# Patient Record
Sex: MALE | Race: WHITE | NOT HISPANIC OR LATINO | Employment: STUDENT | URBAN - METROPOLITAN AREA
[De-identification: names, ages, dates, MRNs, and addresses within clinical notes are randomized per-mention and may not be internally consistent; named-entity substitution may affect disease eponyms.]

---
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
## 2020-02-18 ENCOUNTER — OFFICE VISIT (OUTPATIENT)
Dept: INTERNAL MEDICINE CLINIC | Facility: OTHER | Age: 20
End: 2020-02-18

## 2020-02-18 ENCOUNTER — DOCUMENTATION (OUTPATIENT)
Dept: INTERNAL MEDICINE CLINIC | Facility: OTHER | Age: 20
End: 2020-02-18

## 2020-02-18 VITALS
BODY MASS INDEX: 22.86 KG/M2 | HEART RATE: 72 BPM | WEIGHT: 129 LBS | SYSTOLIC BLOOD PRESSURE: 112 MMHG | DIASTOLIC BLOOD PRESSURE: 62 MMHG | HEIGHT: 63 IN

## 2020-02-18 DIAGNOSIS — Z59.9 INADEQUATE COMMUNITY RESOURCES: Primary | ICD-10-CM

## 2020-02-18 DIAGNOSIS — S05.92XA EYE INJURY, NON-PENETRATING, LEFT, INITIAL ENCOUNTER: ICD-10-CM

## 2020-02-18 SDOH — ECONOMIC STABILITY - INCOME SECURITY: PROBLEM RELATED TO HOUSING AND ECONOMIC CIRCUMSTANCES, UNSPECIFIED: Z59.9

## 2020-02-18 NOTE — PROGRESS NOTES
Assessment/Plan:  Marvin 23year old here for initial visit to [address] with the nurse coordinator  Has insurance and PCP but overdue for annual visit and needs transition to adult provider  Asked to see Flo Light due to eye injury he received while playing football on Saturday  He did not seek treatment of injury when it happened  Left upper eyelid with half inch laceration - dried blood visible  Due to visual disturbances - recommending ER visit  Community care coordinator helped him to get appointment at [facility] practice for tomorrow to hopefully get referral to ophthalmology if not seen in the ER, and to get annual PE  Explained that visual disturbance is concerning and needs prompt attention  Flobrad Light verbalizing understanding and stating he will go to the ER if it gets worse  Recommended ER visit today  Follow-up in 1 week for full provider visit and check connections/eye  Diagnoses and all orders for this visit:    Inadequate community resources    Eye injury, non-penetrating, left, initial encounter          Subjective:   No complaints today  Patient ID: Marlon Kidd  is a 23 y o  male  HPI   Here for initial visit to the medical David Henao today  During intake with nurse coordinator, he revealed eye injury from playing football with his friends on Saturday  He was struck in the eye with the football  He did not seek any medical attention since the injury  He denies any headaches, but admits to pain with extra-occular movement  He admits to some blurred vision  No spots, halos or floaters with vision  Has not taken any medication for pain or discomfort  Has insurance but overdue for PCP visit and will need transition to adult provider since he is 23  Mikayla Obdulio community care coordinator will work on this with him       The following portions of the patient's history were reviewed and updated as appropriate: allergies, current medications, past family history, past medical history, past social history, past surgical history and problem list     Review of Systems   Constitutional: Negative  HENT:        See HPI   Respiratory: Negative  Neurological: Negative  Objective:      /62 (BP Location: Left arm, Patient Position: Sitting, Cuff Size: Standard)   Pulse 72   Ht 5' 2 5" (1 588 m)   Wt 58 5 kg (129 lb)   BMI 23 22 kg/m²          Physical Exam   Constitutional: He is oriented to person, place, and time  He appears well-developed and well-nourished  HENT:   Head: Normocephalic  Eyes: Pupils are equal, round, and reactive to light  EOM are normal    Pain with EOM but movements intact; abrasion on medial aspect of left inner eye/nose; half-inch laceration on left upper eyelid - edges not approximated, but no active bleeding  Sclera with some redness   Pulmonary/Chest: Effort normal    Neurological: He is alert and oriented to person, place, and time  No cranial nerve deficit  Skin: Skin is warm and dry  Psychiatric: He has a normal mood and affect   His behavior is normal  Judgment and thought content normal

## 2020-02-18 NOTE — PROGRESS NOTES
Ami Kade  is here for his initial visit to [facility]  this school year  Consent verified  He is currently in 10th grade at [address]: [address]  Left eye injury playing football on 2/15/20 - blurry vision, pain in left eye, pain with blinking, denies headaches  Student in to meet with Provider due to eye injury  Connections  Insurance: [facility]  PCP: Referred to PCP - Edmundo Gregorio - 10 minute walk from house - informed about Lyft if transportation barrier becomes an issue  Dental: Referred - resources given  Vision: Glasses - Referred - failed vision exam   Mental Health: PHQ-9=9; no self harm risk; scheduled to meet with counselor in school    Student will follow up in 2 weeks to meet with Provider for [facility]

## 2020-02-18 NOTE — PROGRESS NOTES
Student was seen on medical Pamella Srinivasan today and requested assistance in calling PCP - Student goes to [facility] states since student is 23, he needs to be seen by adult clinic  [id number] [facility] to schedule PE and visit for laceration/ blurred vision on left eye  Student was scheduled for tomorrow, 2/19/20 at 3:00PM   [facility] advised must call [facility] before appt to switch assigned PCP  Called [facility] with patient and was able to switch PCP to [address]  [facility]  Student was also given dental Pamella Srinivasan consent for him to sign to be seen on DV

## 2020-02-19 ENCOUNTER — OFFICE VISIT (OUTPATIENT)
Dept: INTERNAL MEDICINE CLINIC | Facility: CLINIC | Age: 20
End: 2020-02-19

## 2020-02-19 ENCOUNTER — HOSPITAL ENCOUNTER (EMERGENCY)
Facility: HOSPITAL | Age: 20
Discharge: HOME | End: 2020-02-19
Attending: EMERGENCY MEDICINE | Admitting: EMERGENCY MEDICINE
Payer: COMMERCIAL

## 2020-02-19 VITALS
DIASTOLIC BLOOD PRESSURE: 80 MMHG | OXYGEN SATURATION: 97 % | SYSTOLIC BLOOD PRESSURE: 118 MMHG | HEIGHT: 62 IN | BODY MASS INDEX: 23.77 KG/M2 | TEMPERATURE: 99.9 F | WEIGHT: 129.19 LBS | HEART RATE: 65 BPM

## 2020-02-19 VITALS
RESPIRATION RATE: 18 BRPM | SYSTOLIC BLOOD PRESSURE: 156 MMHG | DIASTOLIC BLOOD PRESSURE: 68 MMHG | HEART RATE: 61 BPM | TEMPERATURE: 98.7 F | OXYGEN SATURATION: 97 %

## 2020-02-19 DIAGNOSIS — R06.02 SOB (SHORTNESS OF BREATH) ON EXERTION: ICD-10-CM

## 2020-02-19 DIAGNOSIS — Z23 NEED FOR INFLUENZA VACCINATION: ICD-10-CM

## 2020-02-19 DIAGNOSIS — H53.8 BLURRED VISION, LEFT EYE: Primary | ICD-10-CM

## 2020-02-19 DIAGNOSIS — H57.12 LEFT EYE PAIN: Primary | ICD-10-CM

## 2020-02-19 PROBLEM — R06.00 DYSPNEA ON EXERTION: Status: ACTIVE | Noted: 2020-02-19

## 2020-02-19 PROBLEM — R06.09 DYSPNEA ON EXERTION: Status: ACTIVE | Noted: 2020-02-19

## 2020-02-19 PROBLEM — J38.3 VOCAL CORD STRAIN: Status: RESOLVED | Noted: 2019-04-11 | Resolved: 2020-02-19

## 2020-02-19 PROCEDURE — 90686 IIV4 VACC NO PRSV 0.5 ML IM: CPT | Performed by: INTERNAL MEDICINE

## 2020-02-19 PROCEDURE — 99283 EMERGENCY DEPT VISIT LOW MDM: CPT

## 2020-02-19 PROCEDURE — 90471 IMMUNIZATION ADMIN: CPT | Performed by: INTERNAL MEDICINE

## 2020-02-19 PROCEDURE — 3008F BODY MASS INDEX DOCD: CPT | Performed by: INTERNAL MEDICINE

## 2020-02-19 PROCEDURE — 99282 EMERGENCY DEPT VISIT SF MDM: CPT | Performed by: EMERGENCY MEDICINE

## 2020-02-19 PROCEDURE — 99203 OFFICE O/P NEW LOW 30 MIN: CPT | Performed by: INTERNAL MEDICINE

## 2020-02-19 PROCEDURE — 1036F TOBACCO NON-USER: CPT | Performed by: INTERNAL MEDICINE

## 2020-02-19 RX ORDER — ALBUTEROL SULFATE 90 UG/1
2 AEROSOL, METERED RESPIRATORY (INHALATION) EVERY 6 HOURS PRN
Qty: 1 INHALER | Refills: 5 | Status: SHIPPED | OUTPATIENT
Start: 2020-02-19 | End: 2021-03-23 | Stop reason: SDUPTHER

## 2020-02-19 RX ORDER — TETRACAINE HYDROCHLORIDE 5 MG/ML
1 SOLUTION OPHTHALMIC ONCE
Status: COMPLETED | OUTPATIENT
Start: 2020-02-19 | End: 2020-02-19

## 2020-02-19 RX ADMIN — FLUORESCEIN SODIUM 1 STRIP: 1 STRIP OPHTHALMIC at 20:37

## 2020-02-19 RX ADMIN — TETRACAINE HYDROCHLORIDE 1 DROP: 5 SOLUTION OPHTHALMIC at 20:38

## 2020-02-19 NOTE — Clinical Note
Please call patient to let him know that we'd like to obtain baseline labs for him at his earliest convenience  Orders have been placed
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
## 2020-02-19 NOTE — PATIENT INSTRUCTIONS
Blurred Vision   WHAT YOU NEED TO KNOW:   Blurred vision is when you cannot see fine details  You may have blurred vision if you are nearsighted or farsighted and you need glasses  Blurred vision may be caused by a corneal abrasion (scratch on the cornea) or a corneal ulcer (open sore)  You may have blurred vision if your eye came into contact with a chemical  A foreign body or infection may also cause blurred vision  Medical conditions, such as cataracts, glaucoma, detached retina, and nerve disorders can also cause blurred vision  Blurred vision may also be caused by a concussion or a tumor  If you have diabetes, you may develop diabetic retinopathy  Diabetic retinopathy damages the blood vessels of your retina  DISCHARGE INSTRUCTIONS:   Return to the emergency department if:   · You have weakness in an arm or leg, difficulty speaking or seeing, and a severe headache  · You have a fever, eye pain, or discharge  · You have a sudden loss of vision  Contact your healthcare provider if:   · Your blurred vision gets worse  · Your blurred vision is worse in the morning  · You have a sudden headache or eye pain  · Your eye has swelling, redness, or discharge  · You see floaters, flashes of light, fine dots, or cobweb shapes  · You have questions or concerns about your condition or care  Medicines: You may  need any of the following:  · Prescription pain medicine  may be given  Ask how to take this medicine safely  · Antibiotics  help prevent or treat an eye infection caused by bacteria  It may be given as eyedrops or an ointment  · Take your medicine as directed  Contact your healthcare provider if you think your medicine is not helping or if you have side effects  Tell him of her if you are allergic to any medicine  Keep a list of the medicines, vitamins, and herbs you take  Include the amounts, and when and why you take them  Bring the list or the pill bottles to follow-up visits  Carry your medicine list with you in case of an emergency  Manage your blurred vision:  Your healthcare provider may ask you to do any of the following:  · Use artificial tears  to keep your eye moist or to soothe your irritated eye  · Apply a cool compress  to decrease any swelling or pain  Wet a clean washcloth with cool water and place it on your eye  Use the cool compress as often as directed  · Wear an eye patch as directed  to protect your eye  Follow up with your healthcare provider as directed: You may need other eye exams and medicines  Write down your questions so you remember to ask them during your visits  © 2017 [address] Information is for End User's use only and may not be sold, redistributed or otherwise used for commercial purposes  All illustrations and images included in CareNotes® are the copyrighted property of A [employer] , [employer]  or [employer]  The above information is an  only  It is not intended as medical advice for individual conditions or treatments  Talk to your doctor, nurse or pharmacist before following any medical regimen to see if it is safe and effective for you  How to Use a Metered-Dose Inhaler   WHAT YOU NEED TO KNOW:   A metered-dose inhaler is a handheld device that gives you a dose of medicine as a mist  You breathe the medicine deep into your lungs to open your airways  The medicine either gives quick relief or long term control of symptoms  Bronchodilators open your airways  Mucolytics thin secretions and make them easier to cough up  Steroids decrease inflammation in your airways  DISCHARGE INSTRUCTIONS:   Return to the emergency department if:   · Your lips or nails turn blue or gray  · The skin between your ribs or around your neck pulls in with every breath  · You feel short of breath, even after you use your inhaler    Contact your healthcare provider if:   · You feel the medicine spray on your tongue or throat, rather than going into your lungs  · You have to take more puffs from the inhaler than directed, in order to get relief  · You run out of medicine before your next refill is due  · You feel like your medicine is not making your symptoms better  · You have questions or concerns about your condition or care  How to use a metered-dose inhaler:  Practice using your inhaler  Your medicine will work best if you use them correctly  The following steps will help you use your inhaler correctly:  · Prepare your inhaler:     ¨ Remove the cap  Check to make sure there is nothing in the mouthpiece that could block the medicine from coming out  ¨ Shake the inhaler to mix the medicine  ¨ Hold the inhaler upright, with the mouthpiece pointing towards your mouth  · Get ready to breathe in the medicine:      ¨ Keep your mouth away from the inhaler mouthpiece, and breathe out fully to clear your lungs  ¨ Place the mouthpiece between your lips  Close your lips around the mouthpiece to form a seal and prevent a medicine leak  · Start to breathe in slowly through your mouth  as  you press down the canister  This helps the medicine get into your lungs  · Hold your breath for at least 5 seconds  This will help the medicine reach all parts of your lungs, including the smaller parts called the alveoli  · Breathe out slowly through pursed lips  This helps keep your airway open and allows the medicine to be absorbed into more areas  · Repeat puffs of medicine as directed by your healthcare provider  Wait about 2 minutes between puffs  If you need to use a bronchodilator and a steroid inhaler, use the bronchodilator first  Wait 5 minutes then use the steroid inhaler  · Gargle with warm water to remove any leftover medicine from your mouth and throat  Care for your inhaler properly:  Put the cap back on the inhaler after each use to keep the mouthpiece clean   Clean the inhaler at least once a week  Remove the canister and wash the lopez with warm soapy water  Rinse and allow to air dry  Make sure the lopez is completely dry before putting the canister back in  Follow up with your healthcare provider or specialist as directed:  Bring your inhaler to all of your visits  You may be asked to use your inhaler at these visits so your healthcare provider can make sure you are using it correctly  Write down your questions, so you remember to ask them during your visits  © 2017 [address] Information is for End User's use only and may not be sold, redistributed or otherwise used for commercial purposes  All illustrations and images included in CareNotes® are the copyrighted property of A [employer] M [employer]  or [employer]  The above information is an  only  It is not intended as medical advice for individual conditions or treatments  Talk to your doctor, nurse or pharmacist before following any medical regimen to see if it is safe and effective for you
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
## 2020-03-25 ENCOUNTER — TELEPHONE (OUTPATIENT)
Dept: INTERNAL MEDICINE CLINIC | Facility: OTHER | Age: 20
End: 2020-03-25

## 2020-03-25 NOTE — TELEPHONE ENCOUNTER
Spoke with student to check connections and share resources regarding COVID  Student no showed his PE yesterday, but will be calling to reschedule  Student has not been on DV yet  Student is aware if he or any family members experience COVID symptoms, they should contact their PCP to be screened

## 2020-06-19 ENCOUNTER — TELEPHONE (OUTPATIENT)
Dept: INTERNAL MEDICINE CLINIC | Facility: OTHER | Age: 20
End: 2020-06-19

## 2020-10-01 ENCOUNTER — OFFICE VISIT (OUTPATIENT)
Dept: INTERNAL MEDICINE CLINIC | Facility: CLINIC | Age: 20
End: 2020-10-01

## 2020-10-01 VITALS
DIASTOLIC BLOOD PRESSURE: 72 MMHG | WEIGHT: 129.19 LBS | SYSTOLIC BLOOD PRESSURE: 120 MMHG | HEART RATE: 91 BPM | TEMPERATURE: 98.6 F | BODY MASS INDEX: 23.63 KG/M2 | OXYGEN SATURATION: 96 %

## 2020-10-01 DIAGNOSIS — Z23 NEED FOR INFLUENZA VACCINATION: ICD-10-CM

## 2020-10-01 DIAGNOSIS — H57.12 PAIN OF LEFT EYE: Primary | ICD-10-CM

## 2020-10-01 PROCEDURE — 1036F TOBACCO NON-USER: CPT | Performed by: INTERNAL MEDICINE

## 2020-10-01 PROCEDURE — 90471 IMMUNIZATION ADMIN: CPT | Performed by: INTERNAL MEDICINE

## 2020-10-01 PROCEDURE — 90686 IIV4 VACC NO PRSV 0.5 ML IM: CPT | Performed by: INTERNAL MEDICINE

## 2020-10-01 PROCEDURE — 99213 OFFICE O/P EST LOW 20 MIN: CPT | Performed by: INTERNAL MEDICINE

## 2020-11-16 ENCOUNTER — TELEPHONE (OUTPATIENT)
Dept: INTERNAL MEDICINE CLINIC | Facility: CLINIC | Age: 20
End: 2020-11-16

## 2020-11-20 DIAGNOSIS — Z01.01 FAILED VISION SCREEN: Primary | ICD-10-CM

## 2021-03-16 ENCOUNTER — OFFICE VISIT (OUTPATIENT)
Dept: INTERNAL MEDICINE CLINIC | Facility: CLINIC | Age: 21
End: 2021-03-16

## 2021-03-16 VITALS
HEART RATE: 103 BPM | TEMPERATURE: 98.6 F | SYSTOLIC BLOOD PRESSURE: 132 MMHG | WEIGHT: 130 LBS | DIASTOLIC BLOOD PRESSURE: 78 MMHG | BODY MASS INDEX: 23.78 KG/M2

## 2021-03-16 DIAGNOSIS — R07.9 CHEST PAIN, UNSPECIFIED TYPE: Primary | ICD-10-CM

## 2021-03-16 PROCEDURE — 1036F TOBACCO NON-USER: CPT | Performed by: INTERNAL MEDICINE

## 2021-03-16 PROCEDURE — 99213 OFFICE O/P EST LOW 20 MIN: CPT | Performed by: INTERNAL MEDICINE

## 2021-03-16 PROCEDURE — 93000 ELECTROCARDIOGRAM COMPLETE: CPT | Performed by: INTERNAL MEDICINE
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
## 2021-03-22 ENCOUNTER — TELEPHONE (OUTPATIENT)
Dept: INTERNAL MEDICINE CLINIC | Facility: CLINIC | Age: 21
End: 2021-03-22

## 2021-03-22 ENCOUNTER — TELEMEDICINE (OUTPATIENT)
Dept: INTERNAL MEDICINE CLINIC | Facility: CLINIC | Age: 21
End: 2021-03-22

## 2021-03-22 DIAGNOSIS — J01.00 ACUTE MAXILLARY SINUSITIS, RECURRENCE NOT SPECIFIED: Primary | ICD-10-CM

## 2021-03-22 PROCEDURE — 99213 OFFICE O/P EST LOW 20 MIN: CPT | Performed by: HOSPITALIST

## 2021-03-22 RX ORDER — GUAIFENESIN 600 MG
600 TABLET, EXTENDED RELEASE 12 HR ORAL EVERY 12 HOURS SCHEDULED
Qty: 30 TABLET | Refills: 0 | Status: SHIPPED | OUTPATIENT
Start: 2021-03-22

## 2021-03-22 RX ORDER — FLUTICASONE PROPIONATE 50 MCG
1 SPRAY, SUSPENSION (ML) NASAL DAILY
Qty: 1 BOTTLE | Refills: 0 | Status: SHIPPED | OUTPATIENT
Start: 2021-03-22

## 2021-03-22 RX ORDER — ACETAMINOPHEN 500 MG
500 TABLET ORAL EVERY 6 HOURS PRN
COMMUNITY

## 2021-03-22 NOTE — TELEPHONE ENCOUNTER
Patient called in concerned he has been experiencing body aches shortness of breath pressure in his head to the point he cant barely see scheduled appointment for patient and transferred him to speak to Master Martinez

## 2021-03-22 NOTE — TELEPHONE ENCOUNTER
Patient start with symptoms, Headaches ,irritated eyes, stuffy nose , mid back pain  Hard to breath because of the stuffy  Nose and he need to breath thru his mouth  Try tylenol with out a relief  Patient is schedule for today @ 4:30  Advise patient is the symptoms get worsens before 4:30 to give us a call back

## 2021-03-23 DIAGNOSIS — R06.02 SOB (SHORTNESS OF BREATH) ON EXERTION: ICD-10-CM

## 2021-03-23 RX ORDER — ALBUTEROL SULFATE 90 UG/1
2 AEROSOL, METERED RESPIRATORY (INHALATION) EVERY 6 HOURS PRN
Qty: 3 INHALER | Refills: 3 | Status: SHIPPED | OUTPATIENT
Start: 2021-03-23

## 2021-03-23 NOTE — TELEPHONE ENCOUNTER
Name of medication, dose, quantity and frequency    Requested Prescriptions     Pending Prescriptions Disp Refills    albuterol (PROVENTIL HFA,VENTOLIN HFA) 90 mcg/act inhaler 1 Inhaler 5     Sig: Inhale 2 puffs every 6 (six) hours as needed for wheezing or shortness of breath       Number of refills left: NONE    Amount of medication left: NONE    Pharmacy verified and updated- YES, Dany    Additional information:    Patient is completely out   Please send ASAP
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
## 2021-05-14 DIAGNOSIS — J01.00 ACUTE MAXILLARY SINUSITIS, RECURRENCE NOT SPECIFIED: ICD-10-CM

## 2021-05-17 RX ORDER — FLUTICASONE PROPIONATE 50 MCG
SPRAY, SUSPENSION (ML) NASAL
Qty: 16 ML | OUTPATIENT
Start: 2021-05-17

## 2021-06-29 ENCOUNTER — TELEPHONE (OUTPATIENT)
Dept: INTERNAL MEDICINE CLINIC | Facility: CLINIC | Age: 21
End: 2021-06-29

## 2021-06-29 ENCOUNTER — TELEMEDICINE (OUTPATIENT)
Dept: INTERNAL MEDICINE CLINIC | Facility: CLINIC | Age: 21
End: 2021-06-29

## 2021-06-29 DIAGNOSIS — R17 ELEVATED BILIRUBIN: ICD-10-CM

## 2021-06-29 DIAGNOSIS — J45.909 ASTHMA: Primary | ICD-10-CM

## 2021-06-29 PROCEDURE — 99213 OFFICE O/P EST LOW 20 MIN: CPT | Performed by: INTERNAL MEDICINE

## 2021-06-29 NOTE — TELEPHONE ENCOUNTER
Patient had a virtual today 7 is due Return in about 6 months (around 12/29/2021) for Next scheduled follow up   I called patient to schedule an appt & the ph# stated it's not in service

## 2021-06-29 NOTE — PATIENT INSTRUCTIONS
Please use fluticasone-salmeterol inhaler 2 times daily  Please obtain laboratory work (CMP and CBC) at a [facility] when able  Please obtain pulmonary function tests when able
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
## 2021-07-08 ENCOUNTER — APPOINTMENT (OUTPATIENT)
Dept: LAB | Facility: CLINIC | Age: 21
End: 2021-07-08
Payer: COMMERCIAL

## 2021-07-08 DIAGNOSIS — R17 ELEVATED BILIRUBIN: ICD-10-CM

## 2021-07-08 LAB
ALBUMIN SERPL BCP-MCNC: 3.9 G/DL (ref 3.5–5)
ALP SERPL-CCNC: 64 U/L (ref 46–116)
ALT SERPL W P-5'-P-CCNC: 21 U/L (ref 12–78)
ANION GAP SERPL CALCULATED.3IONS-SCNC: 4 MMOL/L (ref 4–13)
AST SERPL W P-5'-P-CCNC: 7 U/L (ref 5–45)
BILIRUB SERPL-MCNC: 0.47 MG/DL (ref 0.2–1)
BUN SERPL-MCNC: 10 MG/DL (ref 5–25)
CALCIUM SERPL-MCNC: 9.1 MG/DL (ref 8.3–10.1)
CHLORIDE SERPL-SCNC: 107 MMOL/L (ref 100–108)
CO2 SERPL-SCNC: 28 MMOL/L (ref 21–32)
CREAT SERPL-MCNC: 1 MG/DL (ref 0.6–1.3)
ERYTHROCYTE [DISTWIDTH] IN BLOOD BY AUTOMATED COUNT: 12.6 % (ref 11.6–15.1)
GFR SERPL CREATININE-BSD FRML MDRD: 108 ML/MIN/1.73SQ M
GLUCOSE P FAST SERPL-MCNC: 102 MG/DL (ref 65–99)
HCT VFR BLD AUTO: 46 % (ref 36.5–49.3)
HGB BLD-MCNC: 15.2 G/DL (ref 12–17)
MCH RBC QN AUTO: 27.7 PG (ref 26.8–34.3)
MCHC RBC AUTO-ENTMCNC: 33 G/DL (ref 31.4–37.4)
MCV RBC AUTO: 84 FL (ref 82–98)
PLATELET # BLD AUTO: 279 THOUSANDS/UL (ref 149–390)
PMV BLD AUTO: 10.1 FL (ref 8.9–12.7)
POTASSIUM SERPL-SCNC: 4 MMOL/L (ref 3.5–5.3)
PROT SERPL-MCNC: 7.5 G/DL (ref 6.4–8.2)
RBC # BLD AUTO: 5.48 MILLION/UL (ref 3.88–5.62)
SODIUM SERPL-SCNC: 139 MMOL/L (ref 136–145)
WBC # BLD AUTO: 6.08 THOUSAND/UL (ref 4.31–10.16)

## 2021-07-08 PROCEDURE — 36415 COLL VENOUS BLD VENIPUNCTURE: CPT

## 2021-07-08 PROCEDURE — 80053 COMPREHEN METABOLIC PANEL: CPT

## 2021-07-08 PROCEDURE — 85027 COMPLETE CBC AUTOMATED: CPT

## 2021-07-13 ENCOUNTER — TELEPHONE (OUTPATIENT)
Dept: INTERNAL MEDICINE CLINIC | Facility: CLINIC | Age: 21
End: 2021-07-13

## 2021-07-14 NOTE — TELEPHONE ENCOUNTER
Please let the patient know that all of his laboratory results, including his total bilirubin, was all within normal limits  Thank you

## 2021-07-15 NOTE — TELEPHONE ENCOUNTER
Called and left detailed voicemail informing patient of labs within normal limits  Informed patient if any further questions to call office

## 2021-07-21 NOTE — TELEPHONE ENCOUNTER
Patient called in asking about the lab results for his bilirubin  He said he wants to know if there is any explanation about why his bilirubin was high at his ER visit and why it is normal now  He said he was in the ER for shortness of breath   Can you review

## 2021-07-22 NOTE — TELEPHONE ENCOUNTER
Please let the patient know that it is unclear what exactly caused his bilirubin to be elevated, especially since he was seen at an ER in [place]  However the most likely explanation was that it was caused by a benign abnormality such as gilbert syndrome which causes an increase in bilirubin secondary to stress on the body  Thank you

## 2021-08-12 ENCOUNTER — PATIENT OUTREACH (OUTPATIENT)
Dept: INTERNAL MEDICINE CLINIC | Facility: OTHER | Age: 21
End: 2021-08-12

## 2022-02-28 NOTE — PROGRESS NOTES
Good morning Dr Walters       Can you please review this note and signed  Is been in my open encounters since 03/21/2021 and I can't close it

## 2022-03-07 ENCOUNTER — TELEPHONE (OUTPATIENT)
Dept: PSYCHIATRY | Facility: CLINIC | Age: 22
End: 2022-03-07

## 2022-03-07 NOTE — TELEPHONE ENCOUNTER
PT mom lvm, reached back out to pt mom  PT is over 18 so will need to give verbal consent  Pt has been added to wait list and suggeted call insurance company

## 2022-08-22 ENCOUNTER — TELEPHONE (OUTPATIENT)
Dept: INTERNAL MEDICINE CLINIC | Facility: CLINIC | Age: 22
End: 2022-08-22

## 2022-08-22 ENCOUNTER — HOSPITAL ENCOUNTER (EMERGENCY)
Facility: HOSPITAL | Age: 22
Discharge: HOME | End: 2022-08-22
Attending: EMERGENCY MEDICINE
Payer: MEDICARE

## 2022-08-22 VITALS
RESPIRATION RATE: 16 BRPM | TEMPERATURE: 98.3 F | SYSTOLIC BLOOD PRESSURE: 113 MMHG | OXYGEN SATURATION: 97 % | HEART RATE: 60 BPM | DIASTOLIC BLOOD PRESSURE: 57 MMHG

## 2022-08-22 DIAGNOSIS — R07.9 CHEST PAIN: Primary | ICD-10-CM

## 2022-08-22 DIAGNOSIS — R06.02 SOB (SHORTNESS OF BREATH) ON EXERTION: ICD-10-CM

## 2022-08-22 DIAGNOSIS — J45.909 ASTHMA: ICD-10-CM

## 2022-08-22 LAB
ATRIAL RATE: 64 BPM
P AXIS: 48 DEGREES
PR INTERVAL: 134 MS
QRS AXIS: 92 DEGREES
QRSD INTERVAL: 88 MS
QT INTERVAL: 412 MS
QTC INTERVAL: 425 MS
T WAVE AXIS: 64 DEGREES
VENTRICULAR RATE: 64 BPM

## 2022-08-22 PROCEDURE — 93005 ELECTROCARDIOGRAM TRACING: CPT

## 2022-08-22 PROCEDURE — 93308 TTE F-UP OR LMTD: CPT | Performed by: EMERGENCY MEDICINE

## 2022-08-22 PROCEDURE — 93010 ELECTROCARDIOGRAM REPORT: CPT | Performed by: INTERNAL MEDICINE

## 2022-08-22 PROCEDURE — 99284 EMERGENCY DEPT VISIT MOD MDM: CPT

## 2022-08-22 PROCEDURE — 99284 EMERGENCY DEPT VISIT MOD MDM: CPT | Performed by: EMERGENCY MEDICINE

## 2022-08-22 RX ORDER — ALBUTEROL SULFATE 90 UG/1
2 AEROSOL, METERED RESPIRATORY (INHALATION) EVERY 4 HOURS PRN
Qty: 8 G | Refills: 0 | Status: SHIPPED | OUTPATIENT
Start: 2022-08-22 | End: 2022-08-22

## 2022-08-22 RX ORDER — ALBUTEROL SULFATE 90 UG/1
2 AEROSOL, METERED RESPIRATORY (INHALATION) EVERY 6 HOURS PRN
Qty: 8 G | Refills: 0 | Status: SHIPPED | OUTPATIENT
Start: 2022-08-22

## 2022-08-22 NOTE — DISCHARGE INSTRUCTIONS
Thank you for coming to the ED for your care  Your EKG and imaging are reassuring    Please monitor your symptoms and if they get worse, we recommend returning for further evaluation

## 2022-08-22 NOTE — Clinical Note
Adarsh Paz was seen and treated in our emergency department on 8/22/2022  Diagnosis:     [address]    He may return on this date: 08/23/2022         If you have any questions or concerns, please don't hesitate to call        jV Triana MD    ______________________________           _______________          _______________  Hospital Representative                              Date                                Time

## 2022-08-22 NOTE — TELEPHONE ENCOUNTER
Spoke to patient, per patient he has had chest pain for 2 weeks now  Chest pain the last 2 days have got worse  Patient is complaining of shortness of breath, slurring words, left arm pain  Patient States he does work out and has been unable to work out due to the chest pain and shortness of breath  I did advise patient that his symptoms are concerning and he should report to ED for evaluation  Patient stated his mom is home and is able to take him to ED  Patient agreeable to going to ED to be evaluated  Patient will call office with any additional questions or concerns
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
## 2022-08-22 NOTE — ED ATTENDING ATTESTATION
8/22/2022  ITerrence MD, saw and evaluated the patient  I have discussed the patient with the resident/non-physician practitioner and agree with the resident's/non-physician practitioner's findings, Plan of Care, and MDM as documented in the resident's/non-physician practitioner's note, except where noted  All available labs and Radiology studies were reviewed  I was present for key portions of any procedure(s) performed by the resident/non-physician practitioner and I was immediately available to provide assistance  At this point I agree with the current assessment done in the Emergency Department  I have conducted an independent evaluation of this patient a history and physical is as follows:    ED Course     80-year-old male presenting to the emergency department for evaluation of left-sided chest pain, left armpit pain, and left hand pain  Patient reports that for the past 2 weeks he has been having intermittent episodes of left-sided chest pain, which now over the past 2 days has been radiating into his left armpit and associated with pain in the palm of the hand  Patient reports shortness of breath with ambulation  Patient reports palpitations occurring when when he has chest pain  Ten systems reviewed and negative except as noted in the history of present illness  The patient is resting comfortably on a stretcher in no acute respiratory distress  The patient appears nontoxic  HEENT reveals moist mucous membranes  Head is normocephalic and atraumatic  Conjunctiva and sclera are normal  Neck is nontender and supple with full range of motion to flexion, extension, lateral rotation  No meningismus appreciated  No masses are appreciated  Lungs are clear to auscultation bilaterally without any wheezes, rales or rhonchi  Heart is regular rate and rhythm without any murmurs, rubs or gallops  Abdomen is soft and nontender without any rebound or guarding   Extremities appear grossly normal without any significant arthropathy  Patient is awake, alert, and oriented x3  The patient has normal interaction  Motor is 5 out of 5           Labs Reviewed - No data to display        Critical Care Time  Procedures

## 2022-08-22 NOTE — TELEPHONE ENCOUNTER
Patient called asking for an appointment  He has been having progressively worse chest pain-lower left hand side, pressure, sharp pain for about 1-2 weeks  Left hand looses strength when he has the pain  But he's now having shortness of breath, and the last couple days he has been slurring his words  It might be from exhaustion he said  Call transferred to [facility] for further assistance

## 2023-05-10 ENCOUNTER — OFFICE VISIT (OUTPATIENT)
Dept: INTERNAL MEDICINE CLINIC | Facility: CLINIC | Age: 23
End: 2023-05-10

## 2023-05-10 VITALS
TEMPERATURE: 98.1 F | BODY MASS INDEX: 23.15 KG/M2 | HEART RATE: 82 BPM | OXYGEN SATURATION: 97 % | HEIGHT: 62 IN | SYSTOLIC BLOOD PRESSURE: 117 MMHG | WEIGHT: 125.8 LBS | DIASTOLIC BLOOD PRESSURE: 70 MMHG

## 2023-05-10 DIAGNOSIS — J45.909 MILD ASTHMA WITHOUT COMPLICATION, UNSPECIFIED WHETHER PERSISTENT: ICD-10-CM

## 2023-05-10 DIAGNOSIS — R51.9 LEFT FACIAL PAIN: Primary | ICD-10-CM

## 2023-05-10 NOTE — PATIENT INSTRUCTIONS
Fabricio Merritt,    Today you came in to discuss your left-sided facial pain associated with throat discomfort  This had initially started with ear pain which has since subsided  There are a number of reasons this may be happening, in particular this could represent Cluster headache or TMJ syndrome  However, this may be a different underlying issue altogether and for example trigeminal neuralgia  All of these conditions cause one-sided facial pain and have similar associated symptoms such as headache  However, overall we believe this was more likely a previous viral infection that is causing these odd symptoms  It should resolve in 3-4 days  Please call the clinic during this time if there is no resolution of your complaints  I recommend you come back in 2-3 weeks for your annual physical to investigate your Asthma and recent sheryl pains
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
## 2023-08-17 ENCOUNTER — TELEPHONE (OUTPATIENT)
Dept: INTERNAL MEDICINE CLINIC | Facility: CLINIC | Age: 23
End: 2023-08-17

## 2023-09-12 ENCOUNTER — TELEPHONE (OUTPATIENT)
Dept: PSYCHIATRY | Facility: CLINIC | Age: 23
End: 2023-09-12

## 2023-09-12 NOTE — LETTER
Dear Nora Cee. :    We are contacting you because your name is currently included on the [address] [place] [address] wait-list for Talk Therapy and/or Medication Management. (Please [place] which services are needed)     In our efforts to provide the highest quality care, [facility] has begun the process of upgrading our behavioral health systems to increase efficiency and expedite delivery of services. As part of this process, we ask you to please confirm your continued interest in the services above. If you are no longer interested or in need, please holger “No” in the area below. If you are still interested and in need, please holger “Yes” and provide your most current demographic and insurance information within 15 days. If we do not receive confirmation from you by 2023 your information will not be included in the system upgrade and your place on the waitlist will be lost.     Thank you in advance for your patience and understanding and we apologize for any inconvenience this may cause. Patient Name and :    Still in need of services: Yes or No     Current Address:     Phone#:     Best time to receive a call: Insurance Carrier:      Policy/ID#: Group#: Insurance Services Phone#:      What is your current presenting problem? Open to virtual talk therapy: Yes or No      We will call you to do an Intake when an appointment becomes available. You can send this information back to us in any of the ways below:    Email: [email or web address]  Fax#:  [phone]  Mail:   [address] [place] [address]             [facility], [address] [place] [address]

## 2023-10-10 ENCOUNTER — APPOINTMENT (OUTPATIENT)
Dept: LAB | Facility: CLINIC | Age: 23
End: 2023-10-10
Payer: MEDICARE

## 2023-10-10 ENCOUNTER — TRANSCRIBE ORDERS (OUTPATIENT)
Dept: LAB | Facility: CLINIC | Age: 23
End: 2023-10-10

## 2023-10-10 DIAGNOSIS — F41.1 GENERALIZED ANXIETY DISORDER: ICD-10-CM

## 2023-10-10 DIAGNOSIS — F33.1 MAJOR DEPRESSIVE DISORDER, RECURRENT EPISODE, MODERATE (HCC): ICD-10-CM

## 2023-10-10 DIAGNOSIS — F90.9 ATTENTION DEFICIT HYPERACTIVITY DISORDER (ADHD), UNSPECIFIED ADHD TYPE: ICD-10-CM

## 2023-10-10 DIAGNOSIS — F33.1 MAJOR DEPRESSIVE DISORDER, RECURRENT EPISODE, MODERATE (HCC): Primary | ICD-10-CM

## 2023-10-10 LAB
25(OH)D3 SERPL-MCNC: 16.6 NG/ML (ref 30–100)
ALBUMIN SERPL BCP-MCNC: 5.2 G/DL (ref 3.5–5)
ALP SERPL-CCNC: 56 U/L (ref 34–104)
ALT SERPL W P-5'-P-CCNC: 18 U/L (ref 7–52)
ANION GAP SERPL CALCULATED.3IONS-SCNC: 6 MMOL/L
AST SERPL W P-5'-P-CCNC: 14 U/L (ref 13–39)
BASOPHILS # BLD AUTO: 0.04 THOUSANDS/ÂΜL (ref 0–0.1)
BASOPHILS NFR BLD AUTO: 1 % (ref 0–1)
BILIRUB SERPL-MCNC: 1.52 MG/DL (ref 0.2–1)
BUN SERPL-MCNC: 19 MG/DL (ref 5–25)
CALCIUM SERPL-MCNC: 10.2 MG/DL (ref 8.4–10.2)
CHLORIDE SERPL-SCNC: 104 MMOL/L (ref 96–108)
CHOLEST SERPL-MCNC: 179 MG/DL
CO2 SERPL-SCNC: 30 MMOL/L (ref 21–32)
CREAT SERPL-MCNC: 1.11 MG/DL (ref 0.6–1.3)
EOSINOPHIL # BLD AUTO: 0.1 THOUSAND/ÂΜL (ref 0–0.61)
EOSINOPHIL NFR BLD AUTO: 2 % (ref 0–6)
ERYTHROCYTE [DISTWIDTH] IN BLOOD BY AUTOMATED COUNT: 12.5 % (ref 11.6–15.1)
GFR SERPL CREATININE-BSD FRML MDRD: 93 ML/MIN/1.73SQ M
GLUCOSE P FAST SERPL-MCNC: 88 MG/DL (ref 65–99)
HCT VFR BLD AUTO: 48.5 % (ref 36.5–49.3)
HDLC SERPL-MCNC: 49 MG/DL
HGB BLD-MCNC: 16.3 G/DL (ref 12–17)
IMM GRANULOCYTES # BLD AUTO: 0.01 THOUSAND/UL (ref 0–0.2)
IMM GRANULOCYTES NFR BLD AUTO: 0 % (ref 0–2)
LDLC SERPL CALC-MCNC: 118 MG/DL (ref 0–100)
LYMPHOCYTES # BLD AUTO: 1.51 THOUSANDS/ÂΜL (ref 0.6–4.47)
LYMPHOCYTES NFR BLD AUTO: 36 % (ref 14–44)
MCH RBC QN AUTO: 27.7 PG (ref 26.8–34.3)
MCHC RBC AUTO-ENTMCNC: 33.6 G/DL (ref 31.4–37.4)
MCV RBC AUTO: 83 FL (ref 82–98)
MONOCYTES # BLD AUTO: 0.44 THOUSAND/ÂΜL (ref 0.17–1.22)
MONOCYTES NFR BLD AUTO: 10 % (ref 4–12)
NEUTROPHILS # BLD AUTO: 2.15 THOUSANDS/ÂΜL (ref 1.85–7.62)
NEUTS SEG NFR BLD AUTO: 51 % (ref 43–75)
NONHDLC SERPL-MCNC: 130 MG/DL
NRBC BLD AUTO-RTO: 0 /100 WBCS
PLATELET # BLD AUTO: 255 THOUSANDS/UL (ref 149–390)
PMV BLD AUTO: 10.2 FL (ref 8.9–12.7)
POTASSIUM SERPL-SCNC: 4.4 MMOL/L (ref 3.5–5.3)
PROT SERPL-MCNC: 7.8 G/DL (ref 6.4–8.4)
RBC # BLD AUTO: 5.88 MILLION/UL (ref 3.88–5.62)
SODIUM SERPL-SCNC: 140 MMOL/L (ref 135–147)
TRIGL SERPL-MCNC: 61 MG/DL
TSH SERPL DL<=0.05 MIU/L-ACNC: 1.4 UIU/ML (ref 0.45–4.5)
WBC # BLD AUTO: 4.25 THOUSAND/UL (ref 4.31–10.16)

## 2023-10-10 PROCEDURE — 80053 COMPREHEN METABOLIC PANEL: CPT

## 2023-10-10 PROCEDURE — 84443 ASSAY THYROID STIM HORMONE: CPT

## 2023-10-10 PROCEDURE — 82306 VITAMIN D 25 HYDROXY: CPT

## 2023-10-10 PROCEDURE — 80061 LIPID PANEL: CPT

## 2023-10-10 PROCEDURE — 85025 COMPLETE CBC W/AUTO DIFF WBC: CPT

## 2023-10-10 PROCEDURE — 36415 COLL VENOUS BLD VENIPUNCTURE: CPT

## 2023-11-08 ENCOUNTER — TELEPHONE (OUTPATIENT)
Dept: INTERNAL MEDICINE CLINIC | Facility: CLINIC | Age: 23
End: 2023-11-08

## 2023-11-08 NOTE — TELEPHONE ENCOUNTER
Pt called to make office aware of labs being sent over from Psychiatrist. They had some concerns regarding his liver and wants his PCP to review and give patient a call if he needs to come in and be seen.

## 2023-11-10 ENCOUNTER — TELEPHONE (OUTPATIENT)
Dept: INTERNAL MEDICINE CLINIC | Facility: CLINIC | Age: 23
End: 2023-11-10

## 2023-11-10 NOTE — TELEPHONE ENCOUNTER
Patient called to request if Provider could review his labs. Because his psychiatrist was concerned about his liver results    Please give you a call once test have been reviewed

## 2023-12-12 ENCOUNTER — OFFICE VISIT (OUTPATIENT)
Dept: INTERNAL MEDICINE CLINIC | Facility: CLINIC | Age: 23
End: 2023-12-12

## 2023-12-12 VITALS
SYSTOLIC BLOOD PRESSURE: 119 MMHG | HEART RATE: 79 BPM | BODY MASS INDEX: 24.18 KG/M2 | OXYGEN SATURATION: 98 % | DIASTOLIC BLOOD PRESSURE: 66 MMHG | TEMPERATURE: 98.2 F | WEIGHT: 132.2 LBS

## 2023-12-12 DIAGNOSIS — Z00.00 ANNUAL PHYSICAL EXAM: Primary | ICD-10-CM

## 2023-12-12 DIAGNOSIS — R17 TOTAL BILIRUBIN, ELEVATED: ICD-10-CM

## 2023-12-12 DIAGNOSIS — Z11.4 SCREENING FOR HIV (HUMAN IMMUNODEFICIENCY VIRUS): ICD-10-CM

## 2023-12-12 DIAGNOSIS — Z11.59 NEED FOR HEPATITIS C SCREENING TEST: ICD-10-CM

## 2023-12-12 DIAGNOSIS — R06.02 SOB (SHORTNESS OF BREATH) ON EXERTION: ICD-10-CM

## 2023-12-12 PROCEDURE — 99395 PREV VISIT EST AGE 18-39: CPT | Performed by: STUDENT IN AN ORGANIZED HEALTH CARE EDUCATION/TRAINING PROGRAM

## 2023-12-12 RX ORDER — MELATONIN
1000 DAILY
COMMUNITY

## 2023-12-12 RX ORDER — LAMOTRIGINE 25 MG/1
25 TABLET ORAL DAILY
COMMUNITY

## 2023-12-12 RX ORDER — ALBUTEROL SULFATE 90 UG/1
2 AEROSOL, METERED RESPIRATORY (INHALATION) EVERY 6 HOURS PRN
COMMUNITY
End: 2023-12-12 | Stop reason: SDUPTHER

## 2023-12-12 RX ORDER — ALBUTEROL SULFATE 90 UG/1
2 AEROSOL, METERED RESPIRATORY (INHALATION) EVERY 6 HOURS PRN
Qty: 8 G | Refills: 2 | Status: SHIPPED | OUTPATIENT
Start: 2023-12-12

## 2023-12-12 RX ORDER — ALBUTEROL SULFATE 90 UG/1
2 AEROSOL, METERED RESPIRATORY (INHALATION) EVERY 6 HOURS PRN
Qty: 8 G | Refills: 0 | Status: CANCELLED | OUTPATIENT
Start: 2023-12-12
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
## 2023-12-13 ENCOUNTER — APPOINTMENT (OUTPATIENT)
Dept: LAB | Facility: CLINIC | Age: 23
End: 2023-12-13
Payer: MEDICARE

## 2023-12-13 DIAGNOSIS — Z11.59 NEED FOR HEPATITIS C SCREENING TEST: ICD-10-CM

## 2023-12-13 DIAGNOSIS — R17 TOTAL BILIRUBIN, ELEVATED: ICD-10-CM

## 2023-12-13 DIAGNOSIS — Z11.4 SCREENING FOR HIV (HUMAN IMMUNODEFICIENCY VIRUS): ICD-10-CM

## 2023-12-13 LAB
ALBUMIN SERPL BCP-MCNC: 5 G/DL (ref 3.5–5)
ALP SERPL-CCNC: 53 U/L (ref 34–104)
ALT SERPL W P-5'-P-CCNC: 31 U/L (ref 7–52)
ANION GAP SERPL CALCULATED.3IONS-SCNC: 8 MMOL/L
AST SERPL W P-5'-P-CCNC: 18 U/L (ref 13–39)
BILIRUB DIRECT SERPL-MCNC: 0.28 MG/DL (ref 0–0.2)
BILIRUB SERPL-MCNC: 1.59 MG/DL (ref 0.2–1)
BUN SERPL-MCNC: 13 MG/DL (ref 5–25)
CALCIUM SERPL-MCNC: 9.5 MG/DL (ref 8.4–10.2)
CHLORIDE SERPL-SCNC: 103 MMOL/L (ref 96–108)
CO2 SERPL-SCNC: 30 MMOL/L (ref 21–32)
CREAT SERPL-MCNC: 1.04 MG/DL (ref 0.6–1.3)
GFR SERPL CREATININE-BSD FRML MDRD: 100 ML/MIN/1.73SQ M
GLUCOSE P FAST SERPL-MCNC: 88 MG/DL (ref 65–99)
HCV AB SER QL: NORMAL
HIV 1+2 AB+HIV1 P24 AG SERPL QL IA: NORMAL
HIV 2 AB SERPL QL IA: NORMAL
HIV1 AB SERPL QL IA: NORMAL
HIV1 P24 AG SERPL QL IA: NORMAL
POTASSIUM SERPL-SCNC: 3.9 MMOL/L (ref 3.5–5.3)
PROT SERPL-MCNC: 7.1 G/DL (ref 6.4–8.4)
SODIUM SERPL-SCNC: 141 MMOL/L (ref 135–147)

## 2023-12-13 PROCEDURE — 82248 BILIRUBIN DIRECT: CPT

## 2023-12-13 PROCEDURE — 86803 HEPATITIS C AB TEST: CPT

## 2023-12-13 PROCEDURE — 36415 COLL VENOUS BLD VENIPUNCTURE: CPT

## 2023-12-13 PROCEDURE — 87389 HIV-1 AG W/HIV-1&-2 AB AG IA: CPT

## 2023-12-13 PROCEDURE — 80053 COMPREHEN METABOLIC PANEL: CPT

## 2023-12-14 DIAGNOSIS — R17 TOTAL BILIRUBIN, ELEVATED: Primary | ICD-10-CM

## 2023-12-20 ENCOUNTER — HOSPITAL ENCOUNTER (OUTPATIENT)
Dept: RADIOLOGY | Facility: HOSPITAL | Age: 23
Discharge: HOME | End: 2023-12-20
Payer: MEDICARE

## 2023-12-20 DIAGNOSIS — R17 TOTAL BILIRUBIN, ELEVATED: ICD-10-CM

## 2023-12-20 PROCEDURE — 76705 ECHO EXAM OF ABDOMEN: CPT

## 2024-02-21 PROBLEM — Z01.01 FAILED VISION SCREEN: Status: RESOLVED | Noted: 2017-09-06 | Resolved: 2024-02-21

## 2024-07-16 ENCOUNTER — TELEPHONE (OUTPATIENT)
Dept: INTERNAL MEDICINE CLINIC | Facility: CLINIC | Age: 24
End: 2024-07-16

## 2024-12-27 ENCOUNTER — TELEPHONE (OUTPATIENT)
Dept: INTERNAL MEDICINE CLINIC | Facility: CLINIC | Age: 24
End: 2024-12-27

## 2025-02-14 ENCOUNTER — TELEPHONE (OUTPATIENT)
Dept: INTERNAL MEDICINE CLINIC | Facility: CLINIC | Age: 25
End: 2025-02-14